# Patient Record
Sex: MALE | Race: WHITE | NOT HISPANIC OR LATINO
[De-identification: names, ages, dates, MRNs, and addresses within clinical notes are randomized per-mention and may not be internally consistent; named-entity substitution may affect disease eponyms.]

---

## 2019-03-24 ENCOUNTER — TRANSCRIPTION ENCOUNTER (OUTPATIENT)
Age: 29
End: 2019-03-24

## 2020-01-18 ENCOUNTER — TRANSCRIPTION ENCOUNTER (OUTPATIENT)
Age: 30
End: 2020-01-18

## 2021-02-10 ENCOUNTER — TRANSCRIPTION ENCOUNTER (OUTPATIENT)
Age: 31
End: 2021-02-10

## 2021-10-01 ENCOUNTER — TRANSCRIPTION ENCOUNTER (OUTPATIENT)
Age: 31
End: 2021-10-01

## 2021-10-02 ENCOUNTER — EMERGENCY (EMERGENCY)
Facility: HOSPITAL | Age: 31
LOS: 1 days | Discharge: HOME | End: 2021-10-02
Attending: EMERGENCY MEDICINE | Admitting: EMERGENCY MEDICINE
Payer: COMMERCIAL

## 2021-10-02 VITALS
OXYGEN SATURATION: 98 % | TEMPERATURE: 99 F | SYSTOLIC BLOOD PRESSURE: 143 MMHG | DIASTOLIC BLOOD PRESSURE: 87 MMHG | HEART RATE: 62 BPM | RESPIRATION RATE: 17 BRPM

## 2021-10-02 VITALS
WEIGHT: 143.96 LBS | RESPIRATION RATE: 16 BRPM | OXYGEN SATURATION: 96 % | HEART RATE: 72 BPM | HEIGHT: 68 IN | SYSTOLIC BLOOD PRESSURE: 153 MMHG | DIASTOLIC BLOOD PRESSURE: 94 MMHG | TEMPERATURE: 98 F

## 2021-10-02 LAB
ALBUMIN SERPL ELPH-MCNC: 4.4 G/DL — SIGNIFICANT CHANGE UP (ref 3.4–5)
ALP SERPL-CCNC: 54 U/L — SIGNIFICANT CHANGE UP (ref 40–120)
ALT FLD-CCNC: 27 U/L — SIGNIFICANT CHANGE UP (ref 12–42)
ANION GAP SERPL CALC-SCNC: 6 MMOL/L — LOW (ref 9–16)
APTT BLD: 29 SEC — SIGNIFICANT CHANGE UP (ref 27.5–35.5)
AST SERPL-CCNC: 21 U/L — SIGNIFICANT CHANGE UP (ref 15–37)
BILIRUB SERPL-MCNC: 2.6 MG/DL — HIGH (ref 0.2–1.2)
BUN SERPL-MCNC: 14 MG/DL — SIGNIFICANT CHANGE UP (ref 7–23)
CALCIUM SERPL-MCNC: 9.6 MG/DL — SIGNIFICANT CHANGE UP (ref 8.5–10.5)
CHLORIDE SERPL-SCNC: 106 MMOL/L — SIGNIFICANT CHANGE UP (ref 96–108)
CK SERPL-CCNC: 120 U/L — SIGNIFICANT CHANGE UP (ref 39–308)
CO2 SERPL-SCNC: 31 MMOL/L — SIGNIFICANT CHANGE UP (ref 22–31)
CREAT SERPL-MCNC: 1.04 MG/DL — SIGNIFICANT CHANGE UP (ref 0.5–1.3)
GLUCOSE SERPL-MCNC: 91 MG/DL — SIGNIFICANT CHANGE UP (ref 70–99)
HCT VFR BLD CALC: 39.7 % — SIGNIFICANT CHANGE UP (ref 39–50)
HGB BLD-MCNC: 14.1 G/DL — SIGNIFICANT CHANGE UP (ref 13–17)
INR BLD: 1.1 — SIGNIFICANT CHANGE UP (ref 0.88–1.16)
MAGNESIUM SERPL-MCNC: 1.9 MG/DL — SIGNIFICANT CHANGE UP (ref 1.6–2.6)
MCHC RBC-ENTMCNC: 31.8 PG — SIGNIFICANT CHANGE UP (ref 27–34)
MCHC RBC-ENTMCNC: 35.5 GM/DL — SIGNIFICANT CHANGE UP (ref 32–36)
MCV RBC AUTO: 89.4 FL — SIGNIFICANT CHANGE UP (ref 80–100)
NRBC # BLD: 0 /100 WBCS — SIGNIFICANT CHANGE UP (ref 0–0)
NT-PROBNP SERPL-SCNC: 13 PG/ML — SIGNIFICANT CHANGE UP
PLATELET # BLD AUTO: 176 K/UL — SIGNIFICANT CHANGE UP (ref 150–400)
POTASSIUM SERPL-MCNC: 3.8 MMOL/L — SIGNIFICANT CHANGE UP (ref 3.5–5.3)
POTASSIUM SERPL-SCNC: 3.8 MMOL/L — SIGNIFICANT CHANGE UP (ref 3.5–5.3)
PROT SERPL-MCNC: 7.8 G/DL — SIGNIFICANT CHANGE UP (ref 6.4–8.2)
PROTHROM AB SERPL-ACNC: 13.1 SEC — SIGNIFICANT CHANGE UP (ref 10.6–13.6)
RAPID RVP RESULT: SIGNIFICANT CHANGE UP
RBC # BLD: 4.44 M/UL — SIGNIFICANT CHANGE UP (ref 4.2–5.8)
RBC # FLD: 10.9 % — SIGNIFICANT CHANGE UP (ref 10.3–14.5)
SARS-COV-2 RNA SPEC QL NAA+PROBE: SIGNIFICANT CHANGE UP
SODIUM SERPL-SCNC: 143 MMOL/L — SIGNIFICANT CHANGE UP (ref 132–145)
TROPONIN I SERPL-MCNC: <0.017 NG/ML — LOW (ref 0.02–0.06)
TSH SERPL-MCNC: 1.61 UIU/ML — SIGNIFICANT CHANGE UP (ref 0.36–3.74)
WBC # BLD: 5.44 K/UL — SIGNIFICANT CHANGE UP (ref 3.8–10.5)
WBC # FLD AUTO: 5.44 K/UL — SIGNIFICANT CHANGE UP (ref 3.8–10.5)

## 2021-10-02 PROCEDURE — 99285 EMERGENCY DEPT VISIT HI MDM: CPT

## 2021-10-02 PROCEDURE — 93010 ELECTROCARDIOGRAM REPORT: CPT

## 2021-10-02 PROCEDURE — 70450 CT HEAD/BRAIN W/O DYE: CPT | Mod: 26

## 2021-10-02 RX ORDER — MECLIZINE HCL 12.5 MG
12.5 TABLET ORAL ONCE
Refills: 0 | Status: COMPLETED | OUTPATIENT
Start: 2021-10-02 | End: 2021-10-02

## 2021-10-02 RX ORDER — MECLIZINE HCL 12.5 MG
1 TABLET ORAL
Qty: 18 | Refills: 0
Start: 2021-10-02 | End: 2021-10-07

## 2021-10-02 RX ADMIN — Medication 12.5 MILLIGRAM(S): at 16:09

## 2021-10-02 NOTE — ED PROVIDER NOTE - CARE PROVIDERS DIRECT ADDRESSES
,[email or web address]

## 2021-10-02 NOTE — ED ADULT TRIAGE NOTE - CHIEF COMPLAINT QUOTE
Pt felt dizziness since yesterday 10.30 am only when standing slight headache , not headache now , gcs 15 nil neuro deficits , nil pmhx nil meds, nil chest pain, due to fly to [place] in 2 days has concerns, dr martinez reviewing pt at triage not for stroke code

## 2021-10-02 NOTE — ED PROVIDER NOTE - PATIENT PORTAL LINK FT
You can access the FollowMyHealth Patient Portal offered by [facility] by registering at the following website: [email or web address]. By joining [employer]’s FollowMyHealth portal, you will also be able to view your health information using other applications (apps) compatible with our system.

## 2021-10-02 NOTE — ED PROVIDER NOTE - NSFOLLOWUPINSTRUCTIONS_ED_ALL_ED_FT
Please follow up with the neurologist and the cardiologist.  Please also follow up with your primary care physician.      Please take meclizine (Antivert) 12.5mg one tablet every 8 hours as needed for vertigo or dizziness symptoms.      Your COVID-19 results will be sent via text.    Your test results may take 1-3 days. You will get a text/email.  Please check the patient online portal (Nakia and website) for results. You can create a portal account at [email or web address] [facility] [place]. If you have old records with [employer] or [employer] [facility]  or encounter any difficulties with us you will need to call the HELP line to merge results [phone] (Mon-Fri 8a-5p). Please follow up with the neurologist and the cardiologist.  Please also follow up with your primary care physician.  You may receive a call from our , CHRISTINE Perla to help facilitate these appointments.      Please take meclizine (Antivert) 12.5mg one tablet every 8 hours as needed for vertigo or dizziness symptoms.      Your COVID-19 results will be sent via text.    Your test results may take 1-3 days. You will get a text/email.  Please check the patient online portal (Nakia and website) for results. You can create a portal account at [email or web address] [facility] [place]. If you have old records with [employer] or [employer] [facility]  or encounter any difficulties with us you will need to call the HELP line to merge results [phone] (Mon-Fri 8a-5p).

## 2021-10-02 NOTE — ED ADULT NURSE NOTE - CHIEF COMPLAINT QUOTE
No
Pt felt dizziness since yesterday 10.30 am only when standing slight headache , not headache now , gcs 15 nil neuro deficits , nil pmhx nil meds, nil chest pain, due to fly to [place] in 2 days has concerns, dr martinez reviewing pt at triage not for stroke code

## 2021-10-02 NOTE — ED PROVIDER NOTE - CARE PROVIDER_API CALL
Satya Stevens)  Cardiovascular Disease  [address] [facility] [place] [address]  Phone: [phone]  Fax: [phone]  Follow Up Time:     Wilfrid Humphries)  Neurology; Neuromuscular Medicine  [address] [address]  Phone: [phone]  Fax: [phone]  Follow Up Time:

## 2021-10-02 NOTE — ED PROVIDER NOTE - NSPTACCESSSVCSAPPTDETAILS_ED_ALL_ED_FT
cardiology:  Dr Stevens for LVH on EKG and one hi blood pressure reading, pt presented with vertigo  neuro:  Dr Humphries at [address], pt presented with vertigo, negative labs and CT scan brain

## 2021-10-02 NOTE — ED PROVIDER NOTE - OBJECTIVE STATEMENT
30 y/o M with no significant PMHx presents to ED c/o dizziness/vertigo, onset yesterday. Pt was seen at  yesterday. No other medical complaints, but wanted to get checked due to planned travel to [place] this Monday.   Possible allergy to amoxicillin.

## 2021-10-04 ENCOUNTER — NON-APPOINTMENT (OUTPATIENT)
Age: 31
End: 2021-10-04

## 2021-10-04 ENCOUNTER — APPOINTMENT (OUTPATIENT)
Dept: HEART AND VASCULAR | Facility: CLINIC | Age: 31
End: 2021-10-04
Payer: COMMERCIAL

## 2021-10-04 VITALS — DIASTOLIC BLOOD PRESSURE: 74 MMHG | SYSTOLIC BLOOD PRESSURE: 144 MMHG

## 2021-10-04 DIAGNOSIS — Z78.9 OTHER SPECIFIED HEALTH STATUS: ICD-10-CM

## 2021-10-04 DIAGNOSIS — Z82.49 FAMILY HISTORY OF ISCHEMIC HEART DISEASE AND OTHER DISEASES OF THE CIRCULATORY SYSTEM: ICD-10-CM

## 2021-10-04 PROBLEM — Z00.00 ENCOUNTER FOR PREVENTIVE HEALTH EXAMINATION: Status: ACTIVE | Noted: 2021-10-04

## 2021-10-04 PROCEDURE — 99204 OFFICE O/P NEW MOD 45 MIN: CPT

## 2021-10-04 PROCEDURE — 99072 ADDL SUPL MATRL&STAF TM PHE: CPT

## 2021-10-04 NOTE — DISCUSSION/SUMMARY
[FreeTextEntry1] : Dizziness/abnormal ekg will bring back for echocardiogram for further evaluation, given LVH on ecg\par Borderline HTN - RUDDY  and I had an extensive discussion regarding his blood pressure management. Patient will continue taking current medications in addition to maintaining a low Na diet, with periodic b/p checks at home.\par

## 2021-10-04 NOTE — REASON FOR VISIT
[Symptom and Test Evaluation] : symptom and test evaluation [FreeTextEntry1] : 31 year old man comes in after a visit to ER at [facility]. It seems that recently he was noted to have a relatively acute onset of dizziness. No syncope, but felt unwell. He had a visit at [facility] and meclizine was suggested. He is hypertensive on arrival. No recent primary care evaluation. We are discussing a plan of care.

## 2021-10-05 DIAGNOSIS — R42 DIZZINESS AND GIDDINESS: ICD-10-CM

## 2021-10-05 DIAGNOSIS — Z20.822 CONTACT WITH AND (SUSPECTED) EXPOSURE TO COVID-19: ICD-10-CM

## 2021-10-05 DIAGNOSIS — I42.2 OTHER HYPERTROPHIC CARDIOMYOPATHY: ICD-10-CM

## 2021-10-06 ENCOUNTER — NON-APPOINTMENT (OUTPATIENT)
Age: 31
End: 2021-10-06

## 2021-10-07 ENCOUNTER — APPOINTMENT (OUTPATIENT)
Dept: HEART AND VASCULAR | Facility: CLINIC | Age: 31
End: 2021-10-07
Payer: COMMERCIAL

## 2021-10-07 ENCOUNTER — TRANSCRIPTION ENCOUNTER (OUTPATIENT)
Age: 31
End: 2021-10-07

## 2021-10-07 VITALS
OXYGEN SATURATION: 100 % | DIASTOLIC BLOOD PRESSURE: 80 MMHG | HEIGHT: 68 IN | WEIGHT: 144 LBS | BODY MASS INDEX: 21.82 KG/M2 | SYSTOLIC BLOOD PRESSURE: 118 MMHG | HEART RATE: 62 BPM

## 2021-10-07 PROCEDURE — 99214 OFFICE O/P EST MOD 30 MIN: CPT | Mod: 25

## 2021-10-07 PROCEDURE — 99072 ADDL SUPL MATRL&STAF TM PHE: CPT

## 2021-10-07 PROCEDURE — 93306 TTE W/DOPPLER COMPLETE: CPT

## 2021-10-07 NOTE — REASON FOR VISIT
[Symptom and Test Evaluation] : symptom and test evaluation [FreeTextEntry1] : 31 year old man comes in after a visit to ER at [facility]. It seems that recently he was noted to have a relatively acute onset of dizziness. No syncope, but felt unwell. He had a visit at [facility] and meclizine was suggested. He is hypertensive on arrival. Blood pressure is better on this follow up; echo completed. We are discussing results.

## 2021-10-07 NOTE — DISCUSSION/SUMMARY
[FreeTextEntry1] : Dizziness abnormal ekg will bring in for a stress ekg, pending echo approval\par lester FOX  and I had an extensive discussion regarding his blood pressure management. Patient will continue taking current medications in addition to maintaining a low Na diet, with periodic b/p checks at home.\par

## 2021-11-09 ENCOUNTER — APPOINTMENT (OUTPATIENT)
Dept: HEART AND VASCULAR | Facility: CLINIC | Age: 31
End: 2021-11-09
Payer: COMMERCIAL

## 2021-11-09 PROCEDURE — 99214 OFFICE O/P EST MOD 30 MIN: CPT | Mod: 25

## 2021-11-09 PROCEDURE — 93015 CV STRESS TEST SUPVJ I&R: CPT

## 2021-11-09 PROCEDURE — 99072 ADDL SUPL MATRL&STAF TM PHE: CPT

## 2021-11-09 RX ORDER — METHYLPREDNISOLONE 4 MG/1
4 TABLET ORAL
Qty: 21 | Refills: 0 | Status: ACTIVE | COMMUNITY
Start: 2021-10-07

## 2021-11-09 RX ORDER — MECLIZINE HYDROCHLORIDE 12.5 MG/1
12.5 TABLET ORAL
Qty: 18 | Refills: 0 | Status: ACTIVE | COMMUNITY
Start: 2021-10-03

## 2021-11-09 RX ORDER — MECLIZINE HYDROCHLORIDE 25 MG/1
25 TABLET ORAL
Qty: 21 | Refills: 0 | Status: ACTIVE | COMMUNITY
Start: 2021-10-01

## 2021-11-09 RX ORDER — TRETINOIN 0.05 G/100G
0.05 GEL TOPICAL
Qty: 45 | Refills: 0 | Status: ACTIVE | COMMUNITY
Start: 2021-10-13

## 2021-11-09 RX ORDER — DICLOFENAC SODIUM 50 MG/1
50 TABLET, DELAYED RELEASE ORAL
Qty: 14 | Refills: 0 | Status: ACTIVE | COMMUNITY
Start: 2021-08-30

## 2021-11-09 RX ORDER — TRETINOIN 1 MG/G
0.1 CREAM TOPICAL
Qty: 20 | Refills: 0 | Status: ACTIVE | COMMUNITY
Start: 2021-07-20

## 2021-11-09 NOTE — REASON FOR VISIT
[Symptom and Test Evaluation] : symptom and test evaluation [Hypertension] : hypertension [FreeTextEntry1] : MEDICAL ASSISTANT DID NOT PERFORM AND/OR DOCUMENT VITAL SIGNS ON THIS PATIENT TODAY\par \par Adonis is coming in secondary to dizziness. We are discussing results of stress ekg. Symptoms improved after ENT eval and steroid course. No chest pain. B/p still a bit elevated on follow up;

## 2021-11-09 NOTE — DISCUSSION/SUMMARY
[FreeTextEntry1] : HTN - RUDDY  and I had an extensive discussion regarding his blood pressure management. Patient will continue taking current medications in addition to maintaining a low Na diet, with periodic b/p checks at home.\par dizziness/abnormal ekg Inclined towards a conservative follow up in this patient. We had a careful discussion regarding diet and exercise. Will be happy to re-evaluate.\par MEDICAL ASSISTANT DID NOT PERFORM AND/OR DOCUMENT VITAL SIGNS ON THIS PATIENT TODAY\par

## 2022-03-18 ENCOUNTER — APPOINTMENT (OUTPATIENT)
Dept: HEART AND VASCULAR | Facility: CLINIC | Age: 32
End: 2022-03-18
Payer: COMMERCIAL

## 2022-03-18 VITALS — DIASTOLIC BLOOD PRESSURE: 85 MMHG | SYSTOLIC BLOOD PRESSURE: 135 MMHG

## 2022-03-18 VITALS
OXYGEN SATURATION: 98 % | SYSTOLIC BLOOD PRESSURE: 125 MMHG | HEIGHT: 69 IN | HEART RATE: 63 BPM | DIASTOLIC BLOOD PRESSURE: 87 MMHG | WEIGHT: 147.7 LBS | TEMPERATURE: 97 F | BODY MASS INDEX: 21.88 KG/M2

## 2022-03-18 VITALS — SYSTOLIC BLOOD PRESSURE: 137 MMHG | DIASTOLIC BLOOD PRESSURE: 91 MMHG

## 2022-03-18 DIAGNOSIS — R94.31 ABNORMAL ELECTROCARDIOGRAM [ECG] [EKG]: ICD-10-CM

## 2022-03-18 DIAGNOSIS — R42 DIZZINESS AND GIDDINESS: ICD-10-CM

## 2022-03-18 DIAGNOSIS — R03.0 ELEVATED BLOOD-PRESSURE READING, W/OUT DIAGNOSIS OF HYPERTENSION: ICD-10-CM

## 2022-03-18 PROCEDURE — 99213 OFFICE O/P EST LOW 20 MIN: CPT

## 2022-03-18 PROCEDURE — 99072 ADDL SUPL MATRL&STAF TM PHE: CPT

## 2022-03-18 NOTE — ASSESSMENT
[FreeTextEntry1] : A/P: 31M w/no PMHx presenting for f/u.\par \par #Elevated BP - patient w/elevated pressures here in the clinic. Notes his father was diagnosed with HTN at a young age but did not start treatment until relatively recently.\par - will have patient monitor BPs at home in log and bring it to next clinic visit\par - renal duplex\par \par

## 2022-03-18 NOTE — REASON FOR VISIT
[Symptom and Test Evaluation] : symptom and test evaluation [FreeTextEntry1] : Mr. Mcwilliams is a 30 yo M presenting for f/u. He reports feeling well and denies any complaints. No issues since his last clinic visit. No chest pains, palpitations, SOB, dizziness, syncope, abdominal pain, lower extremity swelling. Remains physically active; works out 4-5x/week

## 2022-03-18 NOTE — DISCUSSION/SUMMARY
[FreeTextEntry1] : Acute worsening chronic issue of HTN will restart monitoring, will get renal u/s and re-evaluate; discussed medications but will hold off for the moment \par Dizziness improving. Inclined towards a conservative follow up in this patient. We had a careful discussion regarding diet and exercise. Will be happy to re-evaluate.\par

## 2022-06-13 ENCOUNTER — APPOINTMENT (OUTPATIENT)
Dept: HEART AND VASCULAR | Facility: CLINIC | Age: 32
End: 2022-06-13

## 2022-08-31 ENCOUNTER — EMERGENCY (EMERGENCY)
Facility: HOSPITAL | Age: 32
LOS: 1 days | Discharge: HOME | End: 2022-08-31
Admitting: EMERGENCY MEDICINE

## 2022-08-31 VITALS
RESPIRATION RATE: 18 BRPM | SYSTOLIC BLOOD PRESSURE: 158 MMHG | DIASTOLIC BLOOD PRESSURE: 92 MMHG | HEART RATE: 71 BPM | OXYGEN SATURATION: 99 % | HEIGHT: 68 IN | TEMPERATURE: 98 F | WEIGHT: 147.93 LBS

## 2022-08-31 VITALS
DIASTOLIC BLOOD PRESSURE: 80 MMHG | RESPIRATION RATE: 17 BRPM | HEART RATE: 61 BPM | TEMPERATURE: 98 F | SYSTOLIC BLOOD PRESSURE: 131 MMHG | OXYGEN SATURATION: 98 %

## 2022-08-31 LAB
ALBUMIN SERPL ELPH-MCNC: 4.8 G/DL — SIGNIFICANT CHANGE UP (ref 3.4–5)
ALP SERPL-CCNC: 53 U/L — SIGNIFICANT CHANGE UP (ref 40–120)
ALT FLD-CCNC: 18 U/L — SIGNIFICANT CHANGE UP (ref 12–42)
ANION GAP SERPL CALC-SCNC: 5 MMOL/L — LOW (ref 9–16)
APPEARANCE UR: CLEAR — SIGNIFICANT CHANGE UP
APTT BLD: 33.2 SEC — SIGNIFICANT CHANGE UP (ref 27.5–35.5)
AST SERPL-CCNC: 17 U/L — SIGNIFICANT CHANGE UP (ref 15–37)
BASOPHILS # BLD AUTO: 0.03 K/UL — SIGNIFICANT CHANGE UP (ref 0–0.2)
BASOPHILS NFR BLD AUTO: 0.7 % — SIGNIFICANT CHANGE UP (ref 0–2)
BILIRUB SERPL-MCNC: 2.6 MG/DL — HIGH (ref 0.2–1.2)
BILIRUB UR-MCNC: NEGATIVE — SIGNIFICANT CHANGE UP
BUN SERPL-MCNC: 10 MG/DL — SIGNIFICANT CHANGE UP (ref 7–23)
CALCIUM SERPL-MCNC: 9.4 MG/DL — SIGNIFICANT CHANGE UP (ref 8.5–10.5)
CHLORIDE SERPL-SCNC: 106 MMOL/L — SIGNIFICANT CHANGE UP (ref 96–108)
CO2 SERPL-SCNC: 30 MMOL/L — SIGNIFICANT CHANGE UP (ref 22–31)
COLOR SPEC: YELLOW — SIGNIFICANT CHANGE UP
CREAT SERPL-MCNC: 1.09 MG/DL — SIGNIFICANT CHANGE UP (ref 0.5–1.3)
DIFF PNL FLD: NEGATIVE — SIGNIFICANT CHANGE UP
EGFR: 91 ML/MIN/1.73M2 — SIGNIFICANT CHANGE UP
EOSINOPHIL # BLD AUTO: 0.08 K/UL — SIGNIFICANT CHANGE UP (ref 0–0.5)
EOSINOPHIL NFR BLD AUTO: 1.9 % — SIGNIFICANT CHANGE UP (ref 0–6)
GLUCOSE SERPL-MCNC: 88 MG/DL — SIGNIFICANT CHANGE UP (ref 70–99)
GLUCOSE UR QL: NEGATIVE — SIGNIFICANT CHANGE UP
HCT VFR BLD CALC: 40.2 % — SIGNIFICANT CHANGE UP (ref 39–50)
HGB BLD-MCNC: 13.8 G/DL — SIGNIFICANT CHANGE UP (ref 13–17)
HIV 1 & 2 AB SERPL IA.RAPID: SIGNIFICANT CHANGE UP
IMM GRANULOCYTES NFR BLD AUTO: 0.2 % — SIGNIFICANT CHANGE UP (ref 0–1.5)
INR BLD: 1.08 — SIGNIFICANT CHANGE UP (ref 0.88–1.16)
KETONES UR-MCNC: NEGATIVE — SIGNIFICANT CHANGE UP
LEUKOCYTE ESTERASE UR-ACNC: NEGATIVE — SIGNIFICANT CHANGE UP
LYMPHOCYTES # BLD AUTO: 1.92 K/UL — SIGNIFICANT CHANGE UP (ref 1–3.3)
LYMPHOCYTES # BLD AUTO: 45 % — HIGH (ref 13–44)
MCHC RBC-ENTMCNC: 30.9 PG — SIGNIFICANT CHANGE UP (ref 27–34)
MCHC RBC-ENTMCNC: 34.3 GM/DL — SIGNIFICANT CHANGE UP (ref 32–36)
MCV RBC AUTO: 89.9 FL — SIGNIFICANT CHANGE UP (ref 80–100)
MONOCYTES # BLD AUTO: 0.46 K/UL — SIGNIFICANT CHANGE UP (ref 0–0.9)
MONOCYTES NFR BLD AUTO: 10.8 % — SIGNIFICANT CHANGE UP (ref 2–14)
NEUTROPHILS # BLD AUTO: 1.77 K/UL — LOW (ref 1.8–7.4)
NEUTROPHILS NFR BLD AUTO: 41.4 % — LOW (ref 43–77)
NITRITE UR-MCNC: NEGATIVE — SIGNIFICANT CHANGE UP
NRBC # BLD: 0 /100 WBCS — SIGNIFICANT CHANGE UP (ref 0–0)
OB PNL STL: POSITIVE
PH UR: 6.5 — SIGNIFICANT CHANGE UP (ref 5–8)
PLATELET # BLD AUTO: 166 K/UL — SIGNIFICANT CHANGE UP (ref 150–400)
POTASSIUM SERPL-MCNC: 4.2 MMOL/L — SIGNIFICANT CHANGE UP (ref 3.5–5.3)
POTASSIUM SERPL-SCNC: 4.2 MMOL/L — SIGNIFICANT CHANGE UP (ref 3.5–5.3)
PROT SERPL-MCNC: 8 G/DL — SIGNIFICANT CHANGE UP (ref 6.4–8.2)
PROT UR-MCNC: NEGATIVE MG/DL — SIGNIFICANT CHANGE UP
PROTHROM AB SERPL-ACNC: 12.5 SEC — SIGNIFICANT CHANGE UP (ref 10.5–13.4)
RBC # BLD: 4.47 M/UL — SIGNIFICANT CHANGE UP (ref 4.2–5.8)
RBC # FLD: 11.2 % — SIGNIFICANT CHANGE UP (ref 10.3–14.5)
SODIUM SERPL-SCNC: 141 MMOL/L — SIGNIFICANT CHANGE UP (ref 132–145)
SP GR SPEC: <=1.005 — SIGNIFICANT CHANGE UP (ref 1–1.03)
UROBILINOGEN FLD QL: 0.2 E.U./DL — SIGNIFICANT CHANGE UP
WBC # BLD: 4.27 K/UL — SIGNIFICANT CHANGE UP (ref 3.8–10.5)
WBC # FLD AUTO: 4.27 K/UL — SIGNIFICANT CHANGE UP (ref 3.8–10.5)

## 2022-08-31 PROCEDURE — 99285 EMERGENCY DEPT VISIT HI MDM: CPT

## 2022-08-31 PROCEDURE — 74177 CT ABD & PELVIS W/CONTRAST: CPT | Mod: 26

## 2022-08-31 NOTE — ED PROVIDER NOTE - CARE PROVIDERS DIRECT ADDRESSES
[email or web address]

## 2022-08-31 NOTE — ED ADULT NURSE NOTE - OBJECTIVE STATEMENT
presents to ED c/o streaks of blood in stool for the past 6 months intermittently. Reports some pain upon bowel movement but otherwise has been constipated throughout. Denies any rectal pain at this time, denies any N/V/D, clots in stool, dizziness, lightheadedness, syncopal episodes, ASA and anticoagulant use at this time.

## 2022-08-31 NOTE — ED PROVIDER NOTE - CARE PROVIDER_API CALL
Lew Gamez)  Gastroenterology; Internal Medicine  [address] [address]  Phone: [phone]  Fax: [phone]  Follow Up Time:     David Robledo)  Gastroenterology; Internal Medicine  [address] [place] [address]  Phone: [phone]  Fax: [phone]  Follow Up Time:

## 2022-08-31 NOTE — ED ADULT TRIAGE NOTE - CHIEF COMPLAINT QUOTE
Pt came in c/o of bright red blood streaks in stool x 2 days. Pt reports this has been an ongoing issue x 1 year. Pt seen at  and told to come in for further evaluation. Denies dizziness, diarrhea

## 2022-08-31 NOTE — ED PROVIDER NOTE - PHYSICAL EXAMINATION
Gen - WDWN, NAD, comfortable and non-toxic appearing  Skin - warm, dry, intact   HEENT - AT/NC, no nasal discharge, airway patent, neck supple and FROM  CV - S1S2, R/R/R  Resp - CTAB, no r/r/w  GI - soft, ND, NT, no CVAT b/l   MS - No acute or gross deformities noted to extremities. No midline spinal tenderness or step off on palpation  Neuro - AxOx3, ambulatory without gait disturbance Gen - WDWN, NAD, comfortable and non-toxic appearing  Skin - warm, dry, intact   HEENT - AT/NC, no nasal discharge, airway patent, neck supple and FROM  CV - S1S2, R/R/R  Resp - CTAB, no r/r/w  GI - soft, ND, NT, no CVAT b/l    - rectal tone intact, no apparent hemorrhoids or fissures, no tenderness on WENDY, no active bleeding, rash, or dc noted   MS - No acute or gross deformities noted to extremities. No midline spinal tenderness or step off on palpation  Neuro - AxOx3, ambulatory without gait disturbance

## 2022-08-31 NOTE — ED PROVIDER NOTE - PATIENT PORTAL LINK FT
You can access the FollowMyHealth Patient Portal offered by [facility] by registering at the following website: [email or web address]. By joining [employer]’s FollowMyHealth portal, you will also be able to view your health information using other applications (apps) compatible with our system.

## 2022-08-31 NOTE — ED PROVIDER NOTE - NSFOLLOWUPINSTRUCTIONS_ED_ALL_ED_FT
Colitis    WHAT YOU NEED TO KNOW:    Colitis is swelling and irritation of your colon. Colitis may be caused by ulcers or a problem with your immune system. Bacteria, a virus, or a parasite may also cause colitis. The cause may not be known. You may have diarrhea, abdominal pain, fever, or blood or mucus in your bowel movement.    DISCHARGE INSTRUCTIONS:    Return to the emergency department if:   •You have sudden trouble breathing.      •Your bowel movements are black or have blood in them.      •You have blood in your vomit.      •You have severe abdominal pain or your abdomen is swollen and feels hard.      •You have any of the following signs of dehydration: ?Dizziness or weakness      ?Dry mouth, cracked lips, or severe thirst      ?Fast heartbeat or breathing      ?Urinating very little or not at all        Call your doctor if:   •Your symptoms get worse or do not go away.      •You have a fever, chills, cough, or feel weak and achy.      •You suddenly lose weight without trying.      •You have questions or concerns about your condition or care.      Medicines:   •Medicines may be given to decrease inflammation in your colon and treat diarrhea.      •Take your medicine as directed. Contact your healthcare provider if you think your medicine is not helping or if you have side effects. Tell your provider if you are allergic to any medicine. Keep a list of the medicines, vitamins, and herbs you take. Include the amounts, and when and why you take them. Bring the list or the pill bottles to follow-up visits. Carry your medicine list with you in case of an emergency.      Manage your symptoms:   •Drink liquids as directed to help prevent dehydration. Good liquids to drink include water, juice, and broth. Ask how much liquid to drink each day. You may need to drink an oral rehydration solution (ORS). An ORS contains a balance of water, salt, and sugar to replace body fluids lost during diarrhea.      •Eat a variety of healthy foods. Healthy foods include fruits, vegetables, whole-grain breads, beans, low-fat dairy products, lean meats, and fish. You may need to eat several small meals throughout the day instead of large meals. Avoid spicy foods, caffeine, chocolate, and foods high in fat.      •Talk to your healthcare provider before you take NSAIDs. NSAIDs can cause worsen your symptoms if ulcers are causing your colitis.      •Start to exercise when you feel better. Regular exercise helps your bowels work normally. Ask about the best exercise plan for you.      Prevent the spread of germs:          •Wash your hands often. Wash your hands several times each day. Wash after you use the bathroom, change a child's diaper, and before you prepare or eat food. Use soap and water every time. Rub your soapy hands together, lacing your fingers. Wash the front and back of your hands, and in between your fingers. Use the fingers of one hand to scrub under the fingernails of the other hand. Wash for at least 20 seconds. Rinse with warm, running water for several seconds. Then dry your hands with a clean towel or paper towel. Use hand  that contains alcohol if soap and water are not available. Do not touch your eyes, nose, or mouth without washing your hands first.  Handwashing           •Cover a sneeze or cough. Use a tissue that covers your mouth and nose. Throw the tissue away in a trash can right away. Use the bend of your arm if a tissue is not available. Wash your hands well with soap and water or use a hand .      •Clean surfaces often. Clean doorknobs, countertops, cell phones, and other surfaces that are touched often. Use a disinfecting wipe, a single-use sponge, or a cloth you can wash and reuse. Use disinfecting  if you do not have wipes. You can create a disinfecting  by mixing 1 part bleach with 10 parts water.      •Ask about vaccines you may need. Vaccines help prevent disease caused by some viruses and bacteria. Get the influenza (flu) vaccine as soon as recommended each year. The flu vaccine is usually available starting in September or October. Flu viruses change, so it is important to get a flu vaccine every year. Get the pneumonia vaccine if recommended. This vaccine is usually recommended every 5 years. Your provider will tell you when to get this vaccine, if needed. Your healthcare provider can tell you if you should get other vaccines, and when to get them.      Follow up with your doctor as directed: You may need to return for a colonoscopy or other tests. Write down how often you have a bowel movements and what they look like. Bring this to your follow-up visits. Write down your questions so you remember to ask them during your visits.        High-Fiber Eating Plan      Fiber, also called dietary fiber, is a type of carbohydrate. It is found foods such as fruits, vegetables, whole grains, and beans. A high-fiber diet can have many health benefits. Your health care provider may recommend a high-fiber diet to help:  •Prevent constipation. Fiber can make your bowel movements more regular.      •Lower your cholesterol.    •Relieve the following conditions:  •Inflammation of veins in the anus (hemorrhoids).      •Inflammation of specific areas of the digestive tract (uncomplicated diverticulosis).      •A problem of the large intestine, also called the colon, that sometimes causes pain and diarrhea (irritable bowel syndrome, or IBS).        •Prevent overeating as part of a weight-loss plan.      •Prevent heart disease, type 2 diabetes, and certain cancers.        What are tips for following this plan?      Reading food labels      •Check the nutrition facts label on food products for the amount of dietary fiber. Choose foods that have 5 grams of fiber or more per serving.    •The goals for recommended daily fiber intake include:  •Men (age 50 or younger): 34–38 g.      •Men (over age 50): 28–34 g.      •Women (age 50 or younger): 25–28 g.      •Women (over age 50): 22–25 g.        Your daily fiber goal is _____________ g.    Shopping     •Choose whole fruits and vegetables instead of processed forms, such as apple juice or applesauce.      •Choose a wide variety of high-fiber foods such as avocados, lentils, oats, and kidney beans.      •Read the nutrition facts label of the foods you choose. Be aware of foods with added fiber. These foods often have high sugar and sodium amounts per serving.      Cooking     •Use whole-grain flour for baking and cooking.      •Cook with brown rice instead of white rice.      Meal planning     •Start the day with a breakfast that is high in fiber, such as a cereal that contains 5 g of fiber or more per serving.      •Eat breads and cereals that are made with whole-grain flour instead of refined flour or white flour.      •Eat brown rice, bulgur wheat, or millet instead of white rice.      •Use beans in place of meat in soups, salads, and pasta dishes.      •Be sure that half of the grains you eat each day are whole grains.      General information   •You can get the recommended daily intake of dietary fiber by:  •Eating a variety of fruits, vegetables, grains, nuts, and beans.      •Taking a fiber supplement if you are not able to take in enough fiber in your diet. It is better to get fiber through food than from a supplement.        •Gradually increase how much fiber you consume. If you increase your intake of dietary fiber too quickly, you may have bloating, cramping, or gas.      •Drink plenty of water to help you digest fiber.      •Choose high-fiber snacks, such as berries, raw vegetables, nuts, and popcorn.        What foods should I eat?    Fruits     Berries. Pears. Apples. Oranges. Avocado. Prunes and raisins. Dried figs.    Vegetables     Sweet potatoes. Spinach. Kale. Artichokes. Cabbage. Broccoli. Cauliflower. Green peas. Carrots. Squash.    Grains     Whole-grain breads. Multigrain cereal. Oats and oatmeal. Brown rice. Barley. Bulgur wheat. Millet. Quinoa. Bran muffins. Popcorn. Rye wafer crackers.    Meats and other proteins     Navy beans, kidney beans, and españa beans. Soybeans. Split peas. Lentils. Nuts and seeds.    Dairy     Fiber-fortified yogurt.    Beverages     Fiber-fortified soy milk. Fiber-fortified orange juice.    Other foods     Fiber bars.    The items listed above may not be a complete list of recommended foods and beverages. Contact a dietitian for more information.       What foods should I avoid?    Fruits     Fruit juice. Cooked, strained fruit.    Vegetables     Fried potatoes. Canned vegetables. Well-cooked vegetables.    Grains     White bread. Pasta made with refined flour. White rice.    Meats and other proteins     Fatty cuts of meat. Fried chicken or fried fish.    Dairy     Milk. Yogurt. Cream cheese. Sour cream.    Fats and oils     Idanha.    Beverages     Soft drinks.    Other foods     Cakes and pastries.    The items listed above may not be a complete list of foods and beverages to avoid. Talk with your dietitian about what choices are best for you.       Summary    •Fiber is a type of carbohydrate. It is found in foods such as fruits, vegetables, whole grains, and beans.      •A high-fiber diet has many benefits. It can help to prevent constipation, lower blood cholesterol, aid weight loss, and reduce your risk of heart disease, diabetes, and certain cancers.      •Increase your intake of fiber gradually. Increasing fiber too quickly may cause cramping, bloating, and gas. Drink plenty of water while you increase the amount of fiber you consume.      •The best sources of fiber include whole fruits and vegetables, whole grains, nuts, seeds, and beans

## 2022-08-31 NOTE — ED PROVIDER NOTE - CLINICAL SUMMARY MEDICAL DECISION MAKING FREE TEXT BOX
AFVSS at time of d/c, pt non-toxic appearing, results, ddx, and f/u plans discussed with pt at bedside, d/c'd home to f/u with PMD, strict return precautions discussed, prompt return to ER for any worsening or new sx, pt verbalized understanding. pt p/w intermittent BRBPR x month with mild change in bowel pattern, afebrile, abd soft, NT, no hemorrhoids or fissures noted on exam, labs with no leukocytosis and H/H stable, urine wnl, CT with mild sigmoid colitis, no perforation or abscess, encouraged low residual diet, course of cipro/flagyl, f/u with GI, AFVSS at time of d/c, pt non-toxic appearing, results, ddx, and f/u plans discussed with pt at bedside, d/c'd home to f/u with PMD, strict return precautions discussed, prompt return to ER for any worsening or new sx, pt verbalized understanding.

## 2022-08-31 NOTE — ED ADULT NURSE NOTE - HISTORY OF COVID-19 VACCINATION
This note will not be viewable in [address] NNTOCIP Post Hospitalization       - Patient was admitted to the care of [facility] on 4/30/17. Post hospitalization episode/transition of care episode resolved. Yes

## 2022-08-31 NOTE — ED PROVIDER NOTE - OBJECTIVE STATEMENT
31 yo M with no known PMHx, presenting c/o BPBPR x months. 33 yo M with no known PMHx, presenting c/o BRBPR x a month.  Pt reports intermittent BRBPR on and off for a month, last episode few days ago with mild constipation and straining during defecation.  Noted streaks of blood around the stool and when wiping with some mild rectal/anal irritation.  Sx resolved spontaneously after a couple BM without intervention. Went to  today and sent in for further evaluation due to expected international traveling x 1 month starting tomorrow. Denies fever, chills, N/V, melena, hematochezia, hematuria, change in urinary function, dysuria, penile d/c, flank pain, HA, dizziness, focal weakness, CP, SOB, palpitations, cough, and malaise. No family h/o CA noted. Pt is sexually active with single female partner, no concerns for STD reported

## 2022-09-01 LAB — BILIRUB DIRECT SERPL-MCNC: 0.3 MG/DL — SIGNIFICANT CHANGE UP (ref 0–0.3)

## 2022-09-01 RX ORDER — CIPROFLOXACIN LACTATE 400MG/40ML
500 VIAL (ML) INTRAVENOUS ONCE
Refills: 0 | Status: COMPLETED | OUTPATIENT
Start: 2022-09-01 | End: 2022-09-01

## 2022-09-01 RX ORDER — WHEAT DEXTRIN 3 G/4 G
3.5 POWDER IN PACKET (EA) ORAL
Qty: 100 | Refills: 0
Start: 2022-09-01

## 2022-09-01 RX ORDER — MOXIFLOXACIN HYDROCHLORIDE TABLETS, 400 MG 400 MG/1
1 TABLET, FILM COATED ORAL
Qty: 14 | Refills: 0
Start: 2022-09-01 | End: 2022-09-07

## 2022-09-01 RX ORDER — METRONIDAZOLE 500 MG
1 TABLET ORAL
Qty: 21 | Refills: 0
Start: 2022-09-01 | End: 2022-09-07

## 2022-09-01 RX ORDER — METRONIDAZOLE 500 MG
500 TABLET ORAL ONCE
Refills: 0 | Status: COMPLETED | OUTPATIENT
Start: 2022-09-01 | End: 2022-09-01

## 2022-09-01 RX ADMIN — Medication 500 MILLIGRAM(S): at 00:59

## 2022-09-02 DIAGNOSIS — K92.1 MELENA: ICD-10-CM

## 2022-09-02 DIAGNOSIS — K52.9 NONINFECTIVE GASTROENTERITIS AND COLITIS, UNSPECIFIED: ICD-10-CM

## 2022-09-02 DIAGNOSIS — Z88.0 ALLERGY STATUS TO PENICILLIN: ICD-10-CM

## 2023-06-06 NOTE — ED ADULT NURSE NOTE - CAS TRG GENERAL AIRWAY, MLM
Quality 226: Preventive Care And Screening: Tobacco Use: Screening And Cessation Intervention: Patient screened for tobacco use and is an ex/non-smoker
Quality 111:Pneumonia Vaccination Status For Older Adults: Pneumococcal Vaccination Previously Received
Detail Level: Detailed
Quality 130: Documentation Of Current Medications In The Medical Record: Current Medications Documented
Patent

## 2023-09-15 NOTE — ED PROVIDER NOTE - RESPIRATORY NEGATIVE STATEMENT, MLM
Provider: DR. DANIELLE     Caller: ROHITH GODINEZ     Phone Number: [phone]     Reason for Call: PATIENT STATES DR. DANIELLE WAS WANTING TO SEND THE PATIENT TO SEE A PROVIDER FOR DIABETES BUT AT THIS TIME HE DOES NOT WANT TO GO SEE THEM AND WOULD LIKE TO STICK WITH DR. DANIELLE FOR HIS DIABETES AT THIS MOMENT.    no chest pain, no cough, and no shortness of breath.

## 2024-05-26 ENCOUNTER — EMERGENCY (EMERGENCY)
Facility: HOSPITAL | Age: 34
LOS: 1 days | Discharge: HOME | End: 2024-05-26
Admitting: EMERGENCY MEDICINE
Payer: COMMERCIAL

## 2024-05-26 VITALS
SYSTOLIC BLOOD PRESSURE: 138 MMHG | RESPIRATION RATE: 16 BRPM | DIASTOLIC BLOOD PRESSURE: 96 MMHG | TEMPERATURE: 98 F | OXYGEN SATURATION: 97 % | HEART RATE: 81 BPM | WEIGHT: 149.91 LBS | HEIGHT: 69 IN

## 2024-05-26 PROCEDURE — 73610 X-RAY EXAM OF ANKLE: CPT | Mod: 26,RT

## 2024-05-26 PROCEDURE — 99284 EMERGENCY DEPT VISIT MOD MDM: CPT

## 2024-05-26 RX ORDER — ACETAMINOPHEN 500 MG
975 TABLET ORAL ONCE
Refills: 0 | Status: COMPLETED | OUTPATIENT
Start: 2024-05-26 | End: 2024-05-26

## 2024-05-26 RX ADMIN — Medication 975 MILLIGRAM(S): at 23:39

## 2024-05-26 NOTE — ED PROVIDER NOTE - PATIENT PORTAL LINK FT
You can access the FollowMyHealth Patient Portal offered by [facility] by registering at the following website: [email or web address]. By joining [employer]’s FollowMyHealth portal, you will also be able to view your health information using other applications (apps) compatible with our system.

## 2024-05-26 NOTE — ED ADULT TRIAGE NOTE - CHIEF COMPLAINT QUOTE
Pt. walk in for R. ankle from playing soccer this afternoon. States that he kicked the floor and felt something pop. Swelling noted to R. ankle.

## 2024-05-26 NOTE — ED PROVIDER NOTE - OBJECTIVE STATEMENT
32 yo m pw acute onset of R ankle pain aching mod in severity non radiating occurred after pt had a mechanical trip and fall twisted the R ankle was able to bare weight initially but progressively increased in severity now has difficulty baring weight.    I have reviewed available current nursing and previous documentation of past medical, surgical, family, and/or social history.

## 2024-05-26 NOTE — ED PROVIDER NOTE - CARE PROVIDER_API CALL
Francis Cole  Orthopaedic Surgery  [address] [place] [address], [address]  [place] [address]  Phone: [phone]  Fax: [phone]  Follow Up Time:

## 2024-05-26 NOTE — ED PROVIDER NOTE - CLINICAL SUMMARY MEDICAL DECISION MAKING FREE TEXT BOX
34 yo m pw acute onset of R ankle pain aching mod in severity non radiating occurred after pt had a mechanical trip and fall twisted the R ankle was able to bare weight initially but progressively increased in severity now has difficulty baring weight. +TTP over the atfl, plan: xr ankle

## 2024-05-26 NOTE — ED PROVIDER NOTE - NS ED ROS FT
Review of Systems    Constitutional: (-) fever  Musculoskeletal: (-) neck pain, (-) back pain  Integumentary: (-) rash, (-) edema  Neurological: (-) weakness, (-) numbness  Heme/Lymph: (-) easy bruising (-) easy bleeding

## 2024-05-26 NOTE — ED ADULT NURSE NOTE - OBJECTIVE STATEMENT
R. ankle from playing soccer this afternoon. States that he kicked the floor and felt something pop. Swelling noted to R. ankle

## 2024-05-26 NOTE — ED ADULT NURSE NOTE - EXTENSIONS OF SELF_ADULT
Reviewed images and discussed with Dr. Lia Clemente. Multiple small lymph nodes of unclear significance. Plan for CT chest with contrast in 3 months. Do BMP a few days in advance to check kidney function. If develops fevers or cough worsening in the interim, needs to call and let us know.      Milly Hightower MSN APRN-ACNP CCRN None

## 2024-05-26 NOTE — ED PROVIDER NOTE - PHYSICAL EXAMINATION
Physical Exam    Vital Signs: I have reviewed the initial vital signs.  Constitutional: well-appearing, appears stated age  Cardiovascular: regular rate, regular rhythm, well-perfused extremities, DP pulse +2 and equal b/l, cap refill <2 sec b/l  Musculoskeletal: supple neck, no lower extremity edema, +R ankle ttp over the ATFL  Integumentary: warm, dry, no rash  Neurologic: LE extremities’ motor and sensory functions grossly intact b/l

## 2024-05-27 PROBLEM — Z78.9 OTHER SPECIFIED HEALTH STATUS: Chronic | Status: ACTIVE | Noted: 2022-08-31
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
## 2024-05-28 DIAGNOSIS — M25.571 PAIN IN RIGHT ANKLE AND JOINTS OF RIGHT FOOT: ICD-10-CM
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
## 2024-05-28 DIAGNOSIS — Z88.0 ALLERGY STATUS TO PENICILLIN: ICD-10-CM

## 2024-05-28 DIAGNOSIS — S93.401A SPRAIN OF UNSPECIFIED LIGAMENT OF RIGHT ANKLE, INITIAL ENCOUNTER: ICD-10-CM
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
## 2024-12-30 ENCOUNTER — EMERGENCY (EMERGENCY)
Facility: HOSPITAL | Age: 34
LOS: 1 days | Discharge: HOME | End: 2024-12-30
Attending: EMERGENCY MEDICINE | Admitting: EMERGENCY MEDICINE
Payer: COMMERCIAL

## 2024-12-30 VITALS
DIASTOLIC BLOOD PRESSURE: 85 MMHG | RESPIRATION RATE: 18 BRPM | TEMPERATURE: 98 F | HEART RATE: 69 BPM | SYSTOLIC BLOOD PRESSURE: 155 MMHG | OXYGEN SATURATION: 99 %

## 2024-12-30 PROCEDURE — 99285 EMERGENCY DEPT VISIT HI MDM: CPT

## 2024-12-30 PROCEDURE — 99053 MED SERV 10PM-8AM 24 HR FAC: CPT

## 2024-12-30 NOTE — ED PROVIDER NOTE - NSFOLLOWUPINSTRUCTIONS_ED_ALL_ED_FT
Reason for ED Visit:  - Right neck pain/dizziness after chiropractor neck adjustment    Findings/Diagnosis:  - No evidence of vertebral artery dissection  - Signs/symptoms are concerning for benign paroxysmal positional vertigo (BPPV)  - Right neck musculoskeletal pain    Please return to the ED immediately for any new, worsening, or concerning symptoms including, but not limited to:   - Sudden onset headaches, nauseousness/vomiting   - Persistent dizziness despite meclizine and trying Joseph-Hallpike maneuver    Please take the following medications at home:   - Meclizine 25 mg every 8 hours as needed for dizziness    - Acetaminophen (Tylenol) 500 to 1000 mg every 6-8 hours as needed for pain   - Ibuprofen (Advil or Motrin) 400 to 600 mg every 6-8 hours as needed for pain, take with food   - Alternate acetaminophen and ibuprofen every 3 hours for optimal pain coverage   - Lidocaine patch (4%, over-the-counter) applied to neck for 12 hours daily    Please follow up with your primary care provider and/or an ENT (otolaryngologist, ear nose and throat doctor) regarding this ED visit.    Thank you for choosing us for your care. Reason for ED Visit:  - Right neck pain/dizziness after chiropractor neck adjustment    Findings/Diagnosis:  - No evidence of vertebral artery dissection  - Signs/symptoms are concerning for benign paroxysmal positional vertigo (BPPV)  - Right neck musculoskeletal pain    Please return to the ED immediately for any new, worsening, or concerning symptoms including, but not limited to:   - Sudden onset headaches, nauseousness/vomiting   - Persistent dizziness despite meclizine and trying Epley maneuver    Please take the following medications at home:   - Meclizine 25 mg every 8 hours as needed for dizziness    - Acetaminophen (Tylenol) 500 to 1000 mg every 6-8 hours as needed for pain   - Ibuprofen (Advil or Motrin) 400 to 600 mg every 6-8 hours as needed for pain, take with food   - Alternate acetaminophen and ibuprofen every 3 hours for optimal pain coverage   - Lidocaine patch (4%, over-the-counter) applied to neck for 12 hours daily    Please follow up with your primary care provider and/or an ENT (otolaryngologist, ear nose and throat doctor) regarding this ED visit.    Thank you for choosing us for your care.

## 2024-12-30 NOTE — ED PROVIDER NOTE - ATTENDING CONTRIBUTION TO CARE
I conducted a thorough H&P on this patient, and I agree with the Resident's assessment & plan.  Thorough neuro exam conducted by me, and no focal deficits or cerebellar dysfunction identified.  Due to mechanism/history, dissection of vertebral or basilar artery suspected, but CTAs were WNL.  Pt seems to have had some improvement after meclizine and he may be suffering from peripheral vertigo (not central.)

## 2024-12-30 NOTE — ED ADULT TRIAGE NOTE - CHIEF COMPLAINT QUOTE
Walk in pt with complaints of nausea, dizziness and right sided neck pain s/p chiropractor adjustment yesterday.

## 2024-12-30 NOTE — ED PROVIDER NOTE - PHYSICAL EXAMINATION
GENERAL: no acute distress, -morphic body habitus  HEENT: atraumatic, normocephalic, vision grossly intact, EOMI, no conjunctivitis or discharge, hearing grossly intact, no nasal discharge or epistaxis, clear pharynx  CV: regular rate, normal rhythm, normal S1/S2, no murmurs/rubs, no cyanosis  PULM: normal work of breathing, normal O2 saturation on RA, clear breath sounds in b/l upper/lower lung fields, no crackles/rales/rhonchi/wheezing  GI: soft/non-tender/nondistended abdomen, no guarding or rebound tenderness, no palpable masses  : no CVA tenderness  NEURO: left beating nystagmus (extinguishing in a few seconds), slight gait instability - otherwise CN 2-12 intact, JEFERSON, 5/5 strength in b/l upper/lower extremities w/ intact sensation, no dysmetria, no facial droop, A&Ox4, follows commands, normal speech, no focal motor or sensory deficits  MSK: no joint tenderness/swelling/erythema, ranging all extremities with no appreciable loss of ROM  EXT: no peripheral edema, no calf tenderness, no redness or swelling  SKIN: warm, dry, and intact, no rashes  PSYCH: appropriate mood and affect

## 2024-12-30 NOTE — ED PROVIDER NOTE - PROGRESS NOTE DETAILS
[facility] PGY3: Patient reports me improving symptoms after receiving meclizine.  Labs remarkable for T. bili 2.4 and creatinine 1.35.  CT scan shows no evidence of ICH, mass effect, midline shift, or arterial dissection/stenosis.  Will prescribe patient meclizine and provide paperwork including Denver-Hallpike maneuver as there may be concern for vertigo.  Patient minimal to plan.  Will request that patient follow-up with PCP to follow-up creatinine/T bilirubin levels.  Plan for discharge home. Leigh PGY3: Patient reports me improving symptoms after receiving meclizine.  Labs remarkable for T. bili 2.4 and creatinine 1.35.  CT scan shows no evidence of ICH, mass effect, midline shift, or arterial dissection/stenosis.  Will prescribe patient meclizine and provide paperwork including Epley maneuver as there may be concern for vertigo.  Patient minimal to plan.  Will request that patient follow-up with PCP to follow-up creatinine/T bilirubin levels.  Plan for discharge home.

## 2024-12-30 NOTE — ED PROVIDER NOTE - NSFOLLOWUPCLINICS_GEN_ALL_ED_FT
[facility]  Otolaryngology (ENT)  [address] [place] [address] - [address]  [place], [place] [address]  Phone: [phone]  Fax:

## 2024-12-30 NOTE — ED PROVIDER NOTE - CLINICAL SUMMARY MEDICAL DECISION MAKING FREE TEXT BOX
35 yo M w/ PMHx of LASIK procedure, multiple inguinal hernia repairs, and other orthopedic surgeries presents for right-sided neck pain, lightheadedness, gait instability, and nauseousness after a chiropractor neck adjustment yesterday.  Patient states that he did not have any neck pain prior to seeing chiropractor yesterday.  Chiropractor perform multiple neck adjustment with tilting head to either direction and cracking his neck.  He denies any head trauma, vision changes, vomiting, room spinning dizziness sensation, headaches, weakness of any extremity, or decrease sensation.     Vital signs are unremarkable.  Physical exam is remarkable for left beating nystagmus (extinguishing in a few seconds), slight gait instability - otherwise CN 2-12 intact, JEFERSON, 5/5 strength in b/l upper/lower extremities w/ intact sensation, no dysmetria, no facial droop.  Concern for musculoskeletal injury versus vertebral artery dissection secondary to chiropractor adjustment.  Plan for labs, CT head Noncon, CT angio head/neck to rule out vertebral artery dissection.

## 2024-12-31 VITALS
OXYGEN SATURATION: 99 % | RESPIRATION RATE: 18 BRPM | HEART RATE: 52 BPM | SYSTOLIC BLOOD PRESSURE: 131 MMHG | DIASTOLIC BLOOD PRESSURE: 87 MMHG | TEMPERATURE: 98 F

## 2024-12-31 LAB
ALBUMIN SERPL ELPH-MCNC: 4 G/DL — SIGNIFICANT CHANGE UP (ref 3.4–5)
ALP SERPL-CCNC: 53 U/L — SIGNIFICANT CHANGE UP (ref 40–120)
ALT FLD-CCNC: 24 U/L — SIGNIFICANT CHANGE UP (ref 12–42)
ANION GAP SERPL CALC-SCNC: 6 MMOL/L — LOW (ref 9–16)
AST SERPL-CCNC: 26 U/L — SIGNIFICANT CHANGE UP (ref 15–37)
BASOPHILS # BLD AUTO: 0.04 K/UL — SIGNIFICANT CHANGE UP (ref 0–0.2)
BASOPHILS NFR BLD AUTO: 0.9 % — SIGNIFICANT CHANGE UP (ref 0–2)
BILIRUB SERPL-MCNC: 2.4 MG/DL — HIGH (ref 0.2–1.2)
BUN SERPL-MCNC: 12 MG/DL — SIGNIFICANT CHANGE UP (ref 7–23)
CALCIUM SERPL-MCNC: 8.9 MG/DL — SIGNIFICANT CHANGE UP (ref 8.5–10.5)
CHLORIDE SERPL-SCNC: 104 MMOL/L — SIGNIFICANT CHANGE UP (ref 96–108)
CO2 SERPL-SCNC: 29 MMOL/L — SIGNIFICANT CHANGE UP (ref 22–31)
CREAT SERPL-MCNC: 1.35 MG/DL — HIGH (ref 0.5–1.3)
EGFR: 70 ML/MIN/1.73M2 — SIGNIFICANT CHANGE UP
EOSINOPHIL # BLD AUTO: 0.12 K/UL — SIGNIFICANT CHANGE UP (ref 0–0.5)
EOSINOPHIL NFR BLD AUTO: 2.8 % — SIGNIFICANT CHANGE UP (ref 0–6)
GLUCOSE SERPL-MCNC: 96 MG/DL — SIGNIFICANT CHANGE UP (ref 70–99)
HCT VFR BLD CALC: 36.4 % — LOW (ref 39–50)
HGB BLD-MCNC: 12.8 G/DL — LOW (ref 13–17)
IMM GRANULOCYTES NFR BLD AUTO: 0.5 % — SIGNIFICANT CHANGE UP (ref 0–0.9)
LYMPHOCYTES # BLD AUTO: 1.74 K/UL — SIGNIFICANT CHANGE UP (ref 1–3.3)
LYMPHOCYTES # BLD AUTO: 40.5 % — SIGNIFICANT CHANGE UP (ref 13–44)
MAGNESIUM SERPL-MCNC: 2.1 MG/DL — SIGNIFICANT CHANGE UP (ref 1.6–2.6)
MCHC RBC-ENTMCNC: 30.9 PG — SIGNIFICANT CHANGE UP (ref 27–34)
MCHC RBC-ENTMCNC: 35.2 G/DL — SIGNIFICANT CHANGE UP (ref 32–36)
MCV RBC AUTO: 87.9 FL — SIGNIFICANT CHANGE UP (ref 80–100)
MONOCYTES # BLD AUTO: 0.42 K/UL — SIGNIFICANT CHANGE UP (ref 0–0.9)
MONOCYTES NFR BLD AUTO: 9.8 % — SIGNIFICANT CHANGE UP (ref 2–14)
NEUTROPHILS # BLD AUTO: 1.96 K/UL — SIGNIFICANT CHANGE UP (ref 1.8–7.4)
NEUTROPHILS NFR BLD AUTO: 45.5 % — SIGNIFICANT CHANGE UP (ref 43–77)
NRBC # BLD: 0 /100 WBCS — SIGNIFICANT CHANGE UP (ref 0–0)
PHOSPHATE SERPL-MCNC: 3 MG/DL — SIGNIFICANT CHANGE UP (ref 2.5–4.5)
PLATELET # BLD AUTO: 161 K/UL — SIGNIFICANT CHANGE UP (ref 150–400)
POTASSIUM SERPL-MCNC: 3.6 MMOL/L — SIGNIFICANT CHANGE UP (ref 3.5–5.3)
POTASSIUM SERPL-SCNC: 3.6 MMOL/L — SIGNIFICANT CHANGE UP (ref 3.5–5.3)
PROT SERPL-MCNC: 7 G/DL — SIGNIFICANT CHANGE UP (ref 6.4–8.2)
RBC # BLD: 4.14 M/UL — LOW (ref 4.2–5.8)
RBC # FLD: 10.8 % — SIGNIFICANT CHANGE UP (ref 10.3–14.5)
SODIUM SERPL-SCNC: 139 MMOL/L — SIGNIFICANT CHANGE UP (ref 132–145)
WBC # BLD: 4.3 K/UL — SIGNIFICANT CHANGE UP (ref 3.8–10.5)
WBC # FLD AUTO: 4.3 K/UL — SIGNIFICANT CHANGE UP (ref 3.8–10.5)

## 2024-12-31 PROCEDURE — 70450 CT HEAD/BRAIN W/O DYE: CPT | Mod: 26,MC,59

## 2024-12-31 PROCEDURE — 70496 CT ANGIOGRAPHY HEAD: CPT | Mod: 26,MC

## 2024-12-31 PROCEDURE — 70498 CT ANGIOGRAPHY NECK: CPT | Mod: 26,MC

## 2024-12-31 RX ORDER — MECLIZINE HCL 12.5 MG
25 TABLET ORAL ONCE
Refills: 0 | Status: COMPLETED | OUTPATIENT
Start: 2024-12-31 | End: 2024-12-31

## 2024-12-31 RX ORDER — MECLIZINE HCL 12.5 MG
1 TABLET ORAL
Qty: 9 | Refills: 0
Start: 2024-12-31 | End: 2025-01-02

## 2024-12-31 RX ORDER — IBUPROFEN 200 MG
600 TABLET ORAL ONCE
Refills: 0 | Status: COMPLETED | OUTPATIENT
Start: 2024-12-31 | End: 2024-12-31

## 2024-12-31 RX ORDER — LIDOCAINE 40 MG/G
1 CREAM TOPICAL ONCE
Refills: 0 | Status: COMPLETED | OUTPATIENT
Start: 2024-12-31 | End: 2024-12-31

## 2024-12-31 RX ADMIN — LIDOCAINE 1 PATCH: 40 CREAM TOPICAL at 03:19

## 2024-12-31 RX ADMIN — Medication 25 MILLIGRAM(S): at 02:20

## 2024-12-31 RX ADMIN — Medication 600 MILLIGRAM(S): at 03:19
